# Patient Record
Sex: MALE | URBAN - METROPOLITAN AREA
[De-identification: names, ages, dates, MRNs, and addresses within clinical notes are randomized per-mention and may not be internally consistent; named-entity substitution may affect disease eponyms.]

---

## 2018-12-06 ENCOUNTER — TRANSFERRED RECORDS (OUTPATIENT)
Dept: HEALTH INFORMATION MANAGEMENT | Facility: CLINIC | Age: 28
End: 2018-12-06

## 2019-01-22 ENCOUNTER — TRANSFERRED RECORDS (OUTPATIENT)
Dept: HEALTH INFORMATION MANAGEMENT | Facility: CLINIC | Age: 29
End: 2019-01-22

## 2019-01-24 ENCOUNTER — TRANSFERRED RECORDS (OUTPATIENT)
Dept: HEALTH INFORMATION MANAGEMENT | Facility: CLINIC | Age: 29
End: 2019-01-24

## 2019-02-05 ENCOUNTER — DOCUMENTATION ONLY (OUTPATIENT)
Dept: GASTROENTEROLOGY | Facility: CLINIC | Age: 29
End: 2019-02-05

## 2019-02-05 NOTE — PROGRESS NOTES
GI notes or primary provider notes related to GI problem:   PCP - [facility]       Pathology reports: Y    Recent Lab  Reports: Y    Radiology Reports (CT/MRI) : Y    Endoscopy:  N/A    Colonoscopy: Y    Referring GI Physician Name: N/A    Referring PCP Name: Dr. Breezy Harrell        Referral Date: 12/6/18    Date Complete Records Received and sent for review: 3/7/19    Date records scanned into epic: 2/6/19 ([facility])    Provider Review Date:     Date review routed back to sender:     Letter sent:       Notes:

## 2019-04-02 NOTE — PROGRESS NOTES
REFERRAL REVIEW FORM    Reason for referral: nausea, abdominal pain    Date records reviewed: 4/2/2019    Previous work up:    Labs  EGD  Colonoscopy  GI evaluation    Recommendation:    Do not schedule--send letter to patient with other list of GI providers - CC to PCP    Comments: patient has long-standing nausea and post-prandial abdominal pain. He recently saw [facility] and underwent an EGD and cscope that were negative. Recommend continued evaluation with established GI provider.